# Patient Record
Sex: MALE | Race: BLACK OR AFRICAN AMERICAN | Employment: FULL TIME | URBAN - METROPOLITAN AREA
[De-identification: names, ages, dates, MRNs, and addresses within clinical notes are randomized per-mention and may not be internally consistent; named-entity substitution may affect disease eponyms.]

---

## 2017-02-15 ENCOUNTER — OFFICE VISIT (OUTPATIENT)
Dept: FAMILY MEDICINE CLINIC | Facility: CLINIC | Age: 37
End: 2017-02-15

## 2017-02-15 ENCOUNTER — TELEPHONE (OUTPATIENT)
Dept: FAMILY MEDICINE CLINIC | Facility: CLINIC | Age: 37
End: 2017-02-15

## 2017-02-15 ENCOUNTER — HOSPITAL ENCOUNTER (OUTPATIENT)
Dept: GENERAL RADIOLOGY | Facility: HOSPITAL | Age: 37
Discharge: HOME | End: 2017-02-15
Attending: FAMILY MEDICINE
Payer: COMMERCIAL

## 2017-02-15 VITALS
DIASTOLIC BLOOD PRESSURE: 92 MMHG | RESPIRATION RATE: 20 BRPM | SYSTOLIC BLOOD PRESSURE: 135 MMHG | TEMPERATURE: 98 F | HEART RATE: 80 BPM | WEIGHT: 282 LBS | BODY MASS INDEX: 39.48 KG/M2 | HEIGHT: 71 IN

## 2017-02-15 DIAGNOSIS — S89.92XA LEFT KNEE INJURY, INITIAL ENCOUNTER: ICD-10-CM

## 2017-02-15 DIAGNOSIS — S89.92XA LEFT KNEE INJURY, INITIAL ENCOUNTER: Primary | ICD-10-CM

## 2017-02-15 PROCEDURE — 73562 X-RAY EXAM OF KNEE 3: CPT

## 2017-02-15 PROCEDURE — 99212 OFFICE O/P EST SF 10 MIN: CPT | Performed by: FAMILY MEDICINE

## 2017-02-15 PROCEDURE — 99202 OFFICE O/P NEW SF 15 MIN: CPT | Performed by: FAMILY MEDICINE

## 2017-02-15 NOTE — PROGRESS NOTES
HPI:    Patient ID: Ana Sparks is a 29year old male. HPI Comments: Pt presents with an injury to his left knee when he slammed it on a ladder about 2 weeks ago. Pt did have swelling at time of injury.  Pt has had persistent pain over the knee cap area

## 2017-02-15 NOTE — TELEPHONE ENCOUNTER
Serena Knott from reg calling regarding order for R knee xray,  Kylah Norm pt is stating it is his L knee that needs xray. Order needs to be changed. Seymour Vernal nurse was notified.

## 2018-10-29 ENCOUNTER — OFFICE VISIT (OUTPATIENT)
Dept: FAMILY MEDICINE CLINIC | Facility: CLINIC | Age: 38
End: 2018-10-29
Payer: COMMERCIAL

## 2018-10-29 VITALS
BODY MASS INDEX: 41.58 KG/M2 | HEART RATE: 98 BPM | TEMPERATURE: 98 F | RESPIRATION RATE: 18 BRPM | DIASTOLIC BLOOD PRESSURE: 93 MMHG | SYSTOLIC BLOOD PRESSURE: 162 MMHG | HEIGHT: 71 IN | WEIGHT: 297 LBS

## 2018-10-29 DIAGNOSIS — N50.82 SCROTUM PAIN: ICD-10-CM

## 2018-10-29 DIAGNOSIS — Z98.52 S/P VASECTOMY: Primary | ICD-10-CM

## 2018-10-29 PROCEDURE — 99212 OFFICE O/P EST SF 10 MIN: CPT | Performed by: FAMILY MEDICINE

## 2018-10-29 PROCEDURE — 99213 OFFICE O/P EST LOW 20 MIN: CPT | Performed by: FAMILY MEDICINE

## 2018-10-29 NOTE — PROGRESS NOTES
HPI:    Patient ID: Farzana Herrera is a 41year old male. Pt presents today with a hx painful lump around the private area where cord was cut after having a vasectomy procedure 5 years ago. Pt did have an ultrasound at the time which was unremarkable.  Pt

## 2018-11-08 ENCOUNTER — HOSPITAL ENCOUNTER (OUTPATIENT)
Dept: ULTRASOUND IMAGING | Facility: HOSPITAL | Age: 38
Discharge: HOME | End: 2018-11-08
Attending: FAMILY MEDICINE
Payer: COMMERCIAL

## 2018-11-08 DIAGNOSIS — N50.82 SCROTUM PAIN: ICD-10-CM

## 2018-11-08 DIAGNOSIS — Z98.52 S/P VASECTOMY: ICD-10-CM

## 2018-11-08 PROCEDURE — 93975 VASCULAR STUDY: CPT | Performed by: FAMILY MEDICINE

## 2018-11-08 PROCEDURE — 76870 US EXAM SCROTUM: CPT | Performed by: FAMILY MEDICINE

## 2019-08-14 ENCOUNTER — HOSPITAL ENCOUNTER (OUTPATIENT)
Facility: HOSPITAL | Age: 39
Setting detail: OBSERVATION
Discharge: HOME | DRG: 310 | End: 2019-08-15
Attending: EMERGENCY MEDICINE | Admitting: HOSPITALIST
Payer: COMMERCIAL

## 2019-08-14 ENCOUNTER — NURSE TRIAGE (OUTPATIENT)
Dept: FAMILY MEDICINE CLINIC | Facility: CLINIC | Age: 39
End: 2019-08-14

## 2019-08-14 ENCOUNTER — APPOINTMENT (OUTPATIENT)
Dept: GENERAL RADIOLOGY | Facility: HOSPITAL | Age: 39
DRG: 310 | End: 2019-08-14
Attending: EMERGENCY MEDICINE
Payer: COMMERCIAL

## 2019-08-14 DIAGNOSIS — R00.2 PALPITATIONS: ICD-10-CM

## 2019-08-14 DIAGNOSIS — R77.8 TROPONIN I ABOVE REFERENCE RANGE: Primary | ICD-10-CM

## 2019-08-14 PROBLEM — R79.89 TROPONIN I ABOVE REFERENCE RANGE: Status: ACTIVE | Noted: 2019-08-14

## 2019-08-14 LAB
ANION GAP SERPL CALC-SCNC: 5 MMOL/L (ref 0–18)
BASOPHILS # BLD AUTO: 0.06 X10(3) UL (ref 0–0.2)
BASOPHILS NFR BLD AUTO: 0.9 %
BUN BLD-MCNC: 15 MG/DL (ref 7–18)
BUN/CREAT SERPL: 11.5 (ref 10–20)
CALCIUM BLD-MCNC: 9.3 MG/DL (ref 8.5–10.1)
CHLORIDE SERPL-SCNC: 105 MMOL/L (ref 98–112)
CHOLEST SMN-MCNC: 152 MG/DL (ref ?–200)
CO2 SERPL-SCNC: 29 MMOL/L (ref 21–32)
CREAT BLD-MCNC: 1.31 MG/DL (ref 0.7–1.3)
D DIMER PPP FEU-MCNC: <0.27 UG/ML FEU (ref ?–0.5)
DEPRECATED RDW RBC AUTO: 39.3 FL (ref 35.1–46.3)
EOSINOPHIL # BLD AUTO: 0.25 X10(3) UL (ref 0–0.7)
EOSINOPHIL NFR BLD AUTO: 3.7 %
ERYTHROCYTE [DISTWIDTH] IN BLOOD BY AUTOMATED COUNT: 13.5 % (ref 11–15)
GLUCOSE BLD-MCNC: 85 MG/DL (ref 70–99)
HCT VFR BLD AUTO: 47.9 % (ref 39–53)
HDLC SERPL-MCNC: 50 MG/DL (ref 40–59)
HGB BLD-MCNC: 15.5 G/DL (ref 13–17.5)
IMM GRANULOCYTES # BLD AUTO: 0.03 X10(3) UL (ref 0–1)
IMM GRANULOCYTES NFR BLD: 0.4 %
LDLC SERPL CALC-MCNC: 68 MG/DL (ref ?–100)
LYMPHOCYTES # BLD AUTO: 2.88 X10(3) UL (ref 1–4)
LYMPHOCYTES NFR BLD AUTO: 42.3 %
MCH RBC QN AUTO: 26.1 PG (ref 26–34)
MCHC RBC AUTO-ENTMCNC: 32.4 G/DL (ref 31–37)
MCV RBC AUTO: 80.6 FL (ref 80–100)
MONOCYTES # BLD AUTO: 0.75 X10(3) UL (ref 0.1–1)
MONOCYTES NFR BLD AUTO: 11 %
NEUTROPHILS # BLD AUTO: 2.84 X10 (3) UL (ref 1.5–7.7)
NEUTROPHILS # BLD AUTO: 2.84 X10(3) UL (ref 1.5–7.7)
NEUTROPHILS NFR BLD AUTO: 41.7 %
NONHDLC SERPL-MCNC: 102 MG/DL (ref ?–130)
NT-PROBNP SERPL-MCNC: 67 PG/ML (ref ?–125)
OSMOLALITY SERPL CALC.SUM OF ELEC: 288 MOSM/KG (ref 275–295)
PLATELET # BLD AUTO: 256 10(3)UL (ref 150–450)
POTASSIUM SERPL-SCNC: 3.8 MMOL/L (ref 3.5–5.1)
RBC # BLD AUTO: 5.94 X10(6)UL (ref 4.3–5.7)
SODIUM SERPL-SCNC: 139 MMOL/L (ref 136–145)
TRIGL SERPL-MCNC: 172 MG/DL (ref 30–149)
TROPONIN I SERPL-MCNC: 0.09 NG/ML (ref ?–0.04)
TROPONIN I SERPL-MCNC: 0.09 NG/ML (ref ?–0.04)
VLDLC SERPL CALC-MCNC: 34 MG/DL (ref 0–30)
WBC # BLD AUTO: 6.8 X10(3) UL (ref 4–11)

## 2019-08-14 PROCEDURE — 93268 ECG RECORD/REVIEW: CPT | Performed by: INTERNAL MEDICINE

## 2019-08-14 PROCEDURE — 71045 X-RAY EXAM CHEST 1 VIEW: CPT | Performed by: EMERGENCY MEDICINE

## 2019-08-14 PROCEDURE — 99222 1ST HOSP IP/OBS MODERATE 55: CPT | Performed by: HOSPITALIST

## 2019-08-14 RX ORDER — ONDANSETRON 2 MG/ML
4 INJECTION INTRAMUSCULAR; INTRAVENOUS EVERY 6 HOURS PRN
Status: DISCONTINUED | OUTPATIENT
Start: 2019-08-14 | End: 2019-08-15

## 2019-08-14 RX ORDER — ASPIRIN 81 MG/1
81 TABLET, CHEWABLE ORAL DAILY
Status: DISCONTINUED | OUTPATIENT
Start: 2019-08-15 | End: 2019-08-15

## 2019-08-14 RX ORDER — NITROGLYCERIN 0.4 MG/1
0.4 TABLET SUBLINGUAL EVERY 5 MIN PRN
Status: DISCONTINUED | OUTPATIENT
Start: 2019-08-14 | End: 2019-08-15

## 2019-08-14 RX ORDER — ASPIRIN 81 MG/1
324 TABLET, CHEWABLE ORAL ONCE
Status: COMPLETED | OUTPATIENT
Start: 2019-08-14 | End: 2019-08-14

## 2019-08-14 RX ORDER — ASPIRIN 81 MG/1
TABLET, CHEWABLE ORAL
Status: COMPLETED
Start: 2019-08-14 | End: 2019-08-14

## 2019-08-14 RX ORDER — METOPROLOL SUCCINATE 25 MG/1
25 TABLET, EXTENDED RELEASE ORAL
Status: DISCONTINUED | OUTPATIENT
Start: 2019-08-14 | End: 2019-08-15

## 2019-08-14 RX ORDER — HEPARIN SODIUM 5000 [USP'U]/ML
5000 INJECTION, SOLUTION INTRAVENOUS; SUBCUTANEOUS EVERY 12 HOURS SCHEDULED
Status: DISCONTINUED | OUTPATIENT
Start: 2019-08-14 | End: 2019-08-15

## 2019-08-14 RX ORDER — SODIUM CHLORIDE 0.9 % (FLUSH) 0.9 %
3 SYRINGE (ML) INJECTION AS NEEDED
Status: DISCONTINUED | OUTPATIENT
Start: 2019-08-14 | End: 2019-08-15

## 2019-08-14 NOTE — TELEPHONE ENCOUNTER
Action Requested: Summary for Provider     []  Critical Lab, Recommendations Needed  [] Need Additional Advice  [x]   FYI    []   Need Orders  [] Need Medications Sent to Pharmacy  []  Other     SUMMARY: Per protocol, advised ER now. Patient agreed.  Stat

## 2019-08-14 NOTE — TELEPHONE ENCOUNTER
Per review of pt's encounters, it appears he has just presented to the [address] [place] [address] ED. No notes available yet.

## 2019-08-14 NOTE — ED INITIAL ASSESSMENT (HPI)
Pt came in for sudden palpitations and dizziness at this gym this morning. Both resolved. Denied CP/SOB. RR even and nonlabored, speaking in full sentences, ambulatory with steady gait.

## 2019-08-14 NOTE — ED PROVIDER NOTES
Patient Seen in: [facility] AND [facility] Emergency Department    History   Patient presents with:  Arrythmia/Palpitations (cardiovascular)    Stated Complaint: elevated HR    HPI  52-year-old male with no significant medical history presenting for evaluation o peripheral edema   Pulmonary/Chest: Effort normal and breath sounds normal.   Abdominal: Soft. He exhibits no distension. There is no tenderness. Musculoskeletal: Normal range of motion. Neurological: He is alert.    No focal deficits   Skin: Skin is wa Differential includes ACS/MI,PE, electrode imbalance, anemia, autonomic instability, vs rate related change versus familial disorder. We will plan to evaluate labs, d-dimer as patient is low risk and reassess.       On reassessment, patient remains asympto

## 2019-08-15 ENCOUNTER — APPOINTMENT (OUTPATIENT)
Dept: CT IMAGING | Facility: HOSPITAL | Age: 39
DRG: 310 | End: 2019-08-15
Attending: INTERNAL MEDICINE
Payer: COMMERCIAL

## 2019-08-15 ENCOUNTER — APPOINTMENT (OUTPATIENT)
Dept: CV DIAGNOSTICS | Facility: HOSPITAL | Age: 39
DRG: 310 | End: 2019-08-15
Attending: HOSPITALIST
Payer: COMMERCIAL

## 2019-08-15 ENCOUNTER — ANCILLARY PROCEDURE (OUTPATIENT)
Dept: CARDIOLOGY | Age: 39
End: 2019-08-15
Attending: INTERNAL MEDICINE

## 2019-08-15 VITALS
RESPIRATION RATE: 18 BRPM | OXYGEN SATURATION: 97 % | BODY MASS INDEX: 36.77 KG/M2 | SYSTOLIC BLOOD PRESSURE: 121 MMHG | HEART RATE: 67 BPM | TEMPERATURE: 98 F | HEIGHT: 71 IN | WEIGHT: 262.69 LBS | DIASTOLIC BLOOD PRESSURE: 63 MMHG

## 2019-08-15 DIAGNOSIS — R00.2 PALPITATIONS: ICD-10-CM

## 2019-08-15 DIAGNOSIS — R00.2 PALPITATIONS: Primary | ICD-10-CM

## 2019-08-15 LAB
TROPONIN I SERPL-MCNC: 0.07 NG/ML (ref ?–0.04)
TSI SER-ACNC: 1.56 MIU/ML (ref 0.36–3.74)

## 2019-08-15 PROCEDURE — 99239 HOSP IP/OBS DSCHRG MGMT >30: CPT | Performed by: HOSPITALIST

## 2019-08-15 PROCEDURE — 75574 CT ANGIO HRT W/3D IMAGE: CPT | Performed by: INTERNAL MEDICINE

## 2019-08-15 PROCEDURE — 93306 TTE W/DOPPLER COMPLETE: CPT | Performed by: HOSPITALIST

## 2019-08-15 RX ORDER — METOPROLOL TARTRATE 50 MG/1
50 TABLET, FILM COATED ORAL ONCE AS NEEDED
Status: DISCONTINUED | OUTPATIENT
Start: 2019-08-16 | End: 2019-08-15

## 2019-08-15 RX ORDER — METOPROLOL TARTRATE 5 MG/5ML
INJECTION INTRAVENOUS
Status: DISCONTINUED
Start: 2019-08-15 | End: 2019-08-15

## 2019-08-15 RX ORDER — ALPRAZOLAM 0.25 MG/1
TABLET ORAL
Status: DISCONTINUED
Start: 2019-08-15 | End: 2019-08-15

## 2019-08-15 RX ORDER — DILTIAZEM HYDROCHLORIDE 5 MG/ML
5 INJECTION INTRAVENOUS SEE ADMIN INSTRUCTIONS
Status: DISCONTINUED | OUTPATIENT
Start: 2019-08-15 | End: 2019-08-15

## 2019-08-15 RX ORDER — NITROGLYCERIN 0.4 MG/1
0.4 TABLET SUBLINGUAL ONCE
Status: COMPLETED | OUTPATIENT
Start: 2019-08-15 | End: 2019-08-15

## 2019-08-15 RX ORDER — METOPROLOL TARTRATE 50 MG/1
50 TABLET, FILM COATED ORAL ONCE
Status: DISCONTINUED | OUTPATIENT
Start: 2019-08-16 | End: 2019-08-15

## 2019-08-15 RX ORDER — DILTIAZEM HYDROCHLORIDE 5 MG/ML
INJECTION INTRAVENOUS
Status: DISCONTINUED
Start: 2019-08-15 | End: 2019-08-15

## 2019-08-15 RX ORDER — METOPROLOL TARTRATE 5 MG/5ML
5 INJECTION INTRAVENOUS SEE ADMIN INSTRUCTIONS
Status: DISCONTINUED | OUTPATIENT
Start: 2019-08-15 | End: 2019-08-15

## 2019-08-15 RX ORDER — METOPROLOL TARTRATE 50 MG/1
50 TABLET, FILM COATED ORAL ONCE AS NEEDED
Status: COMPLETED | OUTPATIENT
Start: 2019-08-15 | End: 2019-08-15

## 2019-08-15 RX ORDER — METOPROLOL TARTRATE 100 MG/1
100 TABLET ORAL ONCE AS NEEDED
Status: COMPLETED | OUTPATIENT
Start: 2019-08-15 | End: 2019-08-15

## 2019-08-15 RX ORDER — METOPROLOL TARTRATE 100 MG/1
100 TABLET ORAL ONCE
Status: DISCONTINUED | OUTPATIENT
Start: 2019-08-15 | End: 2019-08-15

## 2019-08-15 RX ORDER — METOPROLOL TARTRATE 50 MG/1
50 TABLET, FILM COATED ORAL ONCE
Status: DISCONTINUED | OUTPATIENT
Start: 2019-08-15 | End: 2019-08-15

## 2019-08-15 RX ORDER — NITROGLYCERIN 0.4 MG/1
TABLET SUBLINGUAL
Status: DISCONTINUED
Start: 2019-08-15 | End: 2019-08-15

## 2019-08-15 RX ORDER — METOPROLOL TARTRATE 100 MG/1
100 TABLET ORAL ONCE
Status: DISCONTINUED | OUTPATIENT
Start: 2019-08-16 | End: 2019-08-15

## 2019-08-15 RX ORDER — METOPROLOL TARTRATE 100 MG/1
TABLET ORAL
Status: COMPLETED
Start: 2019-08-15 | End: 2019-08-15

## 2019-08-15 RX ORDER — ALPRAZOLAM 0.25 MG/1
0.25 TABLET ORAL ONCE
Status: COMPLETED | OUTPATIENT
Start: 2019-08-15 | End: 2019-08-15

## 2019-08-15 RX ORDER — POTASSIUM CHLORIDE 20 MEQ/1
40 TABLET, EXTENDED RELEASE ORAL ONCE
Status: COMPLETED | OUTPATIENT
Start: 2019-08-15 | End: 2019-08-15

## 2019-08-15 NOTE — PLAN OF CARE
Patient admitted last night. Patient and Wife oriented to room/unit. New Metoprolol initiated. Plan for 2D Echo and Cardiology consult today. Patient with no complaints at present time. Will continue to monitor.     Problem: Patient Centered Care  Goal: Florentino Gonzalez trends  - Monitor for bleeding, hypotension and signs of decreased cardiac output  - Evaluate effectiveness of vasoactive medications to optimize hemodynamic stability  - Monitor arterial and/or venous puncture sites for bleeding and/or hematoma  - Assess

## 2019-08-15 NOTE — DISCHARGE SUMMARY
[facility]  Discharge Summary     Ivone Lee  : 10/5/1980    Status: Inpatient  Day #: 1    Attending: Francine La MD  PCP: Alma Gonzalez MD     Date of Admission: 2019  Date of Discharge: 8/15/2019     Hospital Discharge Diagnos event monitor to evaluate for arrhythmia.      Consultants     Provider Role Specialty    René Mosley MD Consulting Physician  CARDIOLOGY    Brigido Verde MD Consulting Physician  HOSPITALIST           Physical Exam   Blood pressure 136/71,

## 2019-08-15 NOTE — PLAN OF CARE
Patient's discharge instructions were discussed and patient currently with 30 day monitor. Patient will follow up with Dr. Sagrario Sommer after 30 days. Will continue to monitor until discharge.         Problem: Patient Centered Care  Goal: Patient preferences are decreased cardiac output  - Evaluate effectiveness of vasoactive medications to optimize hemodynamic stability  - Monitor arterial and/or venous puncture sites for bleeding and/or hematoma  - Assess quality of pulses, skin color and temperature  - Assess f

## 2019-08-15 NOTE — H&P
[facility]    PATIENT'S NAME: Michelle Mcmahan   ATTENDING PHYSICIAN: Zac Marley MD   PATIENT ACCOUNT#:   [de-identified]    LOCATION:  [address] [place] [address] #:   [id number]       YOB: 1980  ADMISSION DATE:       08/ are available in our system or that I could find in [facility]. He had an initial troponin, however, of 0.089, subsequent was 0.094. His D-dimer was negative at less than 0.25. TSH was essentially normal at 1.560.   He was admitted to the cardiac te No rebound. EXTREMITIES:  No clubbing, cyanosis, calf tenderness, or palpable cords. There was no edema. SKIN:  Warm and dry without any significant rashes. NEUROLOGIC:  The patient was awake, alert, oriented x3 with no focal neurologic deficits.   [address] [place] [address]

## 2019-08-15 NOTE — PROGRESS NOTES
[facility]  Progress Note     Adolfo Moran  : 10/5/1980    Status: Inpatient  Day #: 1    Attending: Vera Mensah MD  PCP: Nestor Goodpasture, MD      Assessment and Plan     Tachycardia - HR was 183 on apple watch after exercise prior to admi cardiopulmonary abnormality.    Dictated by (CST): Blade Soto MD on 8/14/2019 at 21:05     Approved by (CST): Blade Soto MD on 8/14/2019 at 21:05          Ekg 12-lead    Result Date: 8/14/2019  ECG Report  Interpretation  --------------------------

## 2019-08-15 NOTE — IMAGING NOTE
TO RAD HOLDING AT 1645. HX TAKEN PT CONSENTED ON THE FLOOR. [facility] POC TESTING COMPLETED GFR = >60    CREATINE = 1.31    CTA ORDERED BY DR. JANG.      WAS PT GIVEN CTA  PREMEDICATED ON THE FLOOR.    1655: HR 66-72/MIN B

## 2019-08-15 NOTE — PAYOR COMM NOTE
--------------  ADMISSION REVIEW     Payor: [employer]  Subscriber #:  [id number]  Authorization Number: [id number]    Admit date: 8/14/19  Admit time: 2130         Patient Seen in: [facility] Emergency Department    History   Patient presents w PANEL (8) - Abnormal; Notable for the following components:       Result Value    Creatinine 1.31 (*)     All other components within normal limits   TROPONIN I - Abnormal; Notable for the following components:    Troponin 0.089 (*)     All other component pain.  No diaphoresis. He stopped working out, took a shower, rested for a bit but it was about 43 minutes before his heart rate decreased into the 160s and again stayed at that rate for about 45 minutes.   It was a total of about 2 hours before his heart equal, reactive to light. Sclerae anicteric. There was no sinus tenderness. Posterior pharynx was not injected. NECK:  Supple. LUNGS:  Clear with easy respiratory excursions. No wheezes, rhonchi, or rales. HEART:  Regular rate and rhythm.   Normal S1 ADMINISTERED IN LAST 1 DAY:  aspirin chewable tab 324 mg     Date Action Dose Route     8/14/2019 1920 Given 324 mg Oral       aspirin chewable tab 81 mg     Date Action Dose Route     8/15/2019 1033 Given 81 mg Oral       Heparin Sodium (Porcine) 5000 UNI

## 2019-08-15 NOTE — TELEPHONE ENCOUNTER
Patient went to [address] [place] [address] yesterday and was admitted. ASSESSMENT AND PLAN:    1. Episode of tachycardia:  Unsure whether this represented supraventricular tachycardia atrial fibrillation.   The patient is admitted to the cardiac telemetry unit on mon

## 2019-08-15 NOTE — PROGRESS NOTES
ADMISSION NOTE    41year old male with no significant PMH presents with 2 hours of tachycardia 160 to 180 and elevated troponins. Father has h/o tachyarrhytmia as well . Available medical records partially reviewed. Dictation to follow.     Lauren Self

## 2019-08-15 NOTE — PLAN OF CARE
Patient has been resting comfortably in bed, locked in the lowest position, call light within reach. Patient's awaiting BP and HR to decrease to have CTA; patient will discharge with vest if CTA comes back normal.  Will continue to monitor.         Problem for signs of decreased coronary artery perfusion - ex.  Angina  - Evaluate fluid balance, assess for edema, trend weights  Outcome: Progressing  Goal: Absence of cardiac arrhythmias or at baseline  Description  INTERVENTIONS:  - Continuous cardiac monitorin

## 2019-08-15 NOTE — CONSULTS
[facility]    Cardiology Consultation  [facility]    Pedro Morning Patient Status:  Inpatient    10/5/1980 MRN [id number]   Location [facility] 3W/SW Attending Harry Graham MD   Hosp Day # 1 [facility] Sanchez Medications:  nitroGLYCERIN (NITROSTAT) SL tab 0.4 mg 0.4 mg Sublingual Once   Metoprolol Tartrate (LOPRESSOR) tab 50 mg 50 mg Oral Once   Or      Metoprolol Tartrate (LOPRESSOR) tab 100 mg 100 mg Oral Once   [START ON 8/16/2019] Metoprolol Tartrate (LOPRE Daily   • Metoprolol Succinate ER  25 mg Oral Daily Beta Blocker       Continuous Infusions:       Physical Exam:    General: Alert and oriented x 3. No apparent distress. No respiratory  distress.   HEENT: Normocephalic, anicteric sclera, neck supple, no t absence of any arrhythmias. Recommendations:  See above    Thank you for allowing me to participate in the care of your patient.     Jessica Santiago  8/15/2019

## 2019-08-16 ENCOUNTER — TELEPHONE (OUTPATIENT)
Dept: CARDIOLOGY | Age: 39
End: 2019-08-16

## 2019-09-20 ENCOUNTER — TELEPHONE (OUTPATIENT)
Dept: CARDIOLOGY | Age: 39
End: 2019-09-20

## 2021-04-09 DIAGNOSIS — Z23 NEED FOR VACCINATION: ICD-10-CM

## 2022-06-02 ENCOUNTER — OFFICE VISIT (OUTPATIENT)
Dept: FAMILY MEDICINE CLINIC | Facility: CLINIC | Age: 42
End: 2022-06-02
Payer: COMMERCIAL

## 2022-06-02 VITALS
HEIGHT: 71 IN | HEART RATE: 93 BPM | BODY MASS INDEX: 42.56 KG/M2 | TEMPERATURE: 98 F | SYSTOLIC BLOOD PRESSURE: 150 MMHG | DIASTOLIC BLOOD PRESSURE: 91 MMHG | RESPIRATION RATE: 18 BRPM | WEIGHT: 304 LBS

## 2022-06-02 DIAGNOSIS — L60.0 INGROWN TOENAIL: ICD-10-CM

## 2022-06-02 DIAGNOSIS — M67.431 GANGLION OF RIGHT WRIST: ICD-10-CM

## 2022-06-02 PROCEDURE — 3080F DIAST BP >= 90 MM HG: CPT | Performed by: FAMILY MEDICINE

## 2022-06-02 PROCEDURE — 3008F BODY MASS INDEX DOCD: CPT | Performed by: FAMILY MEDICINE

## 2022-06-02 PROCEDURE — 3077F SYST BP >= 140 MM HG: CPT | Performed by: FAMILY MEDICINE

## 2022-06-02 PROCEDURE — 99202 OFFICE O/P NEW SF 15 MIN: CPT | Performed by: FAMILY MEDICINE

## 2022-06-02 NOTE — PROGRESS NOTES
Subjective:   Patient ID: Joselo Pratt is a 37year old male. Pt presents with a lump of the back of the right wrist that has been present for about one month. Pt also has hx of recurring ingrown toenail. No sig symptoms now but would like information for podiatrist if it recurs. History/Other:   Review of Systems  No current outpatient medications on file. Allergies:No Known Allergies    Objective:   Physical Exam  Constitutional:       Appearance: Normal appearance. Musculoskeletal:      Comments: Right hand/ wrist; lump c/w ganglion cyst of the dorsum of right hand/ wrist area. Non-tender. No sig pains. Neurological:      Mental Status: He is alert. Assessment & Plan:   Ganglion of right wrist:  - After discussion with patient, to monitor for symptoms and call if any significant symptoms; consider follow up with Dr Sage Jimenez if sig symptoms or not resolved. Ingrown toenail: hx of ingrown nail: no sig symptoms:  - Information provided for podiatry and referral generated. Follow up as needed. No orders of the defined types were placed in this encounter.       Meds This Visit:  Requested Prescriptions      No prescriptions requested or ordered in this encounter       Imaging & Referrals:  PLASTIC SURGERY - INTERNAL  PODIATRY - INTERNAL

## 2023-10-26 ENCOUNTER — OFFICE VISIT (OUTPATIENT)
Dept: FAMILY MEDICINE CLINIC | Facility: CLINIC | Age: 43
End: 2023-10-26

## 2023-10-26 VITALS
SYSTOLIC BLOOD PRESSURE: 132 MMHG | BODY MASS INDEX: 42 KG/M2 | WEIGHT: 300 LBS | HEART RATE: 101 BPM | TEMPERATURE: 98 F | DIASTOLIC BLOOD PRESSURE: 84 MMHG | HEIGHT: 71 IN | RESPIRATION RATE: 20 BRPM

## 2023-10-26 DIAGNOSIS — Z00.00 ROUTINE PHYSICAL EXAMINATION: Primary | ICD-10-CM

## 2023-10-26 DIAGNOSIS — E66.3 OVERWEIGHT FOR HEIGHT: ICD-10-CM

## 2023-10-26 PROCEDURE — 3075F SYST BP GE 130 - 139MM HG: CPT | Performed by: FAMILY MEDICINE

## 2023-10-26 PROCEDURE — 3079F DIAST BP 80-89 MM HG: CPT | Performed by: FAMILY MEDICINE

## 2023-10-26 PROCEDURE — 99396 PREV VISIT EST AGE 40-64: CPT | Performed by: FAMILY MEDICINE

## 2023-10-26 PROCEDURE — 3008F BODY MASS INDEX DOCD: CPT | Performed by: FAMILY MEDICINE

## 2023-10-26 NOTE — PROGRESS NOTES
Subjective:   Patient ID: Zoya Ramon is a 44year old male. Patient is here for routine physical exam. No acute issues. No significant chronic medical problems. Patient is requesting testing. Diet and exercise have been good. Has been working out and trying to eat better. Some heartburn when he eats late at night. Past medical history, family history, and social history were reviewed. History/Other:   Review of Systems   Constitutional: Negative. Negative for fever. HENT: Negative. Eyes: Negative. Respiratory: Negative. Negative for cough and shortness of breath. Cardiovascular: Negative. Negative for chest pain. Gastrointestinal: Negative. Negative for abdominal distention and abdominal pain. Some acid reflux     Genitourinary: Negative. Negative for difficulty urinating and dysuria. Musculoskeletal: Negative. Negative for back pain. Skin: Negative. Neurological: Negative. Negative for dizziness and headaches. Psychiatric/Behavioral: Negative. Negative for dysphoric mood. The patient is not nervous/anxious. No current outpatient medications on file. Allergies:No Known Allergies    Objective:   Physical Exam  Constitutional:       Appearance: Normal appearance. He is well-developed. HENT:      Head: Normocephalic. Right Ear: Tympanic membrane, ear canal and external ear normal.      Left Ear: Tympanic membrane, ear canal and external ear normal.      Nose: Nose normal.      Mouth/Throat:      Mouth: Mucous membranes are moist.      Pharynx: No oropharyngeal exudate or posterior oropharyngeal erythema. Eyes:      Conjunctiva/sclera: Conjunctivae normal.   Cardiovascular:      Rate and Rhythm: Normal rate and regular rhythm. Pulses: Normal pulses. Heart sounds: Normal heart sounds. Pulmonary:      Effort: Pulmonary effort is normal. No respiratory distress. Breath sounds: Normal breath sounds. No wheezing or rales. Abdominal:      General: Abdomen is flat. There is no distension. Palpations: Abdomen is soft. There is no mass. Tenderness: There is no abdominal tenderness. Musculoskeletal:         General: Normal range of motion. Cervical back: Normal range of motion and neck supple. Skin:     General: Skin is warm. Neurological:      General: No focal deficit present. Mental Status: He is alert and oriented to person, place, and time. Sensory: No sensory deficit. Deep Tendon Reflexes: Reflexes are normal and symmetric. Reflexes normal.   Psychiatric:         Mood and Affect: Mood normal.         Behavior: Behavior normal.         Assessment & Plan:   Routine physical examination:  - Exam is unremarkable. Screening tests were discussed, and after discussion, will check lab work as below. Healthy diet, exercise, and weight were discussed. To call if problems and follow up and further management after testing. Routine follow up. Overweight for height:  - After discussion, will send to Dr Paolo Whitlock for further evaluation and treatment; To call if any significant symptoms. Orders Placed This Encounter      Comp Metabolic Panel (14)      CBC, Platelet;  No Differential      Lipid Panel      Meds This Visit:  Requested Prescriptions      No prescriptions requested or ordered in this encounter       Imaging & Referrals:  BARIATRICS - INTERNAL

## 2023-11-17 ENCOUNTER — LAB ENCOUNTER (OUTPATIENT)
Dept: LAB | Age: 43
End: 2023-11-17
Attending: FAMILY MEDICINE
Payer: COMMERCIAL

## 2023-11-17 DIAGNOSIS — Z00.00 ROUTINE PHYSICAL EXAMINATION: ICD-10-CM

## 2023-11-17 DIAGNOSIS — R73.09 ELEVATED GLUCOSE: ICD-10-CM

## 2023-11-17 LAB
ALBUMIN SERPL-MCNC: 4.5 G/DL (ref 3.2–4.8)
ALBUMIN/GLOB SERPL: 1.5 {RATIO} (ref 1–2)
ALP LIVER SERPL-CCNC: 46 U/L
ALT SERPL-CCNC: 79 U/L
ANION GAP SERPL CALC-SCNC: 5 MMOL/L (ref 0–18)
AST SERPL-CCNC: 43 U/L (ref ?–34)
BILIRUB SERPL-MCNC: 0.6 MG/DL (ref 0.3–1.2)
BUN BLD-MCNC: 10 MG/DL (ref 9–23)
BUN/CREAT SERPL: 8.1 (ref 10–20)
CALCIUM BLD-MCNC: 9.9 MG/DL (ref 8.7–10.4)
CHLORIDE SERPL-SCNC: 107 MMOL/L (ref 98–112)
CHOLEST SERPL-MCNC: 178 MG/DL (ref ?–200)
CO2 SERPL-SCNC: 28 MMOL/L (ref 21–32)
CREAT BLD-MCNC: 1.24 MG/DL
DEPRECATED RDW RBC AUTO: 40.3 FL (ref 35.1–46.3)
EGFRCR SERPLBLD CKD-EPI 2021: 74 ML/MIN/1.73M2 (ref 60–?)
ERYTHROCYTE [DISTWIDTH] IN BLOOD BY AUTOMATED COUNT: 13.5 % (ref 11–15)
FASTING PATIENT LIPID ANSWER: YES
FASTING STATUS PATIENT QL REPORTED: YES
GLOBULIN PLAS-MCNC: 3 G/DL (ref 2.8–4.4)
GLUCOSE BLD-MCNC: 131 MG/DL (ref 70–99)
HCT VFR BLD AUTO: 48.3 %
HDLC SERPL-MCNC: 39 MG/DL (ref 40–59)
HGB BLD-MCNC: 15.2 G/DL
LDLC SERPL CALC-MCNC: 118 MG/DL (ref ?–100)
MCH RBC QN AUTO: 25.8 PG (ref 26–34)
MCHC RBC AUTO-ENTMCNC: 31.5 G/DL (ref 31–37)
MCV RBC AUTO: 82 FL
NONHDLC SERPL-MCNC: 139 MG/DL (ref ?–130)
OSMOLALITY SERPL CALC.SUM OF ELEC: 291 MOSM/KG (ref 275–295)
PLATELET # BLD AUTO: 263 10(3)UL (ref 150–450)
POTASSIUM SERPL-SCNC: 4.4 MMOL/L (ref 3.5–5.1)
PROT SERPL-MCNC: 7.5 G/DL (ref 5.7–8.2)
RBC # BLD AUTO: 5.89 X10(6)UL
SODIUM SERPL-SCNC: 140 MMOL/L (ref 136–145)
TRIGL SERPL-MCNC: 113 MG/DL (ref 30–149)
VLDLC SERPL CALC-MCNC: 20 MG/DL (ref 0–30)
WBC # BLD AUTO: 5.6 X10(3) UL (ref 4–11)

## 2023-11-17 PROCEDURE — 83036 HEMOGLOBIN GLYCOSYLATED A1C: CPT

## 2023-11-17 PROCEDURE — 80061 LIPID PANEL: CPT

## 2023-11-17 PROCEDURE — 85027 COMPLETE CBC AUTOMATED: CPT

## 2023-11-17 PROCEDURE — 80053 COMPREHEN METABOLIC PANEL: CPT

## 2023-11-17 PROCEDURE — 36415 COLL VENOUS BLD VENIPUNCTURE: CPT

## 2023-11-18 LAB
EST. AVERAGE GLUCOSE BLD GHB EST-MCNC: 166 MG/DL (ref 68–126)
HBA1C MFR BLD: 7.4 % (ref ?–5.7)

## 2025-03-13 ENCOUNTER — OFFICE VISIT (OUTPATIENT)
Dept: FAMILY MEDICINE CLINIC | Facility: CLINIC | Age: 45
End: 2025-03-13
Payer: COMMERCIAL

## 2025-03-13 VITALS
DIASTOLIC BLOOD PRESSURE: 76 MMHG | SYSTOLIC BLOOD PRESSURE: 124 MMHG | RESPIRATION RATE: 22 BRPM | WEIGHT: 286 LBS | HEIGHT: 71 IN | BODY MASS INDEX: 40.04 KG/M2 | TEMPERATURE: 98 F | HEART RATE: 108 BPM

## 2025-03-13 DIAGNOSIS — L91.8 SKIN TAG: Primary | ICD-10-CM

## 2025-03-13 PROCEDURE — 99213 OFFICE O/P EST LOW 20 MIN: CPT | Performed by: FAMILY MEDICINE

## 2025-03-13 NOTE — PROGRESS NOTES
Subjective:   Patient ID: Yuni Card is a 44 year old male.    Pt presents with a skin tag of his right hip for many years. Pt states now irritated. No fevers or sig pains.        History/Other:   Review of Systems  No current outpatient medications on file.     Allergies:Allergies[1]    Objective:   Physical Exam  Constitutional:       Appearance: Normal appearance.   Skin:     Comments: Skin growth of the anterior hip area. No drainage or sig erythema.   Neurological:      Mental Status: He is alert.         Assessment & Plan:   1. Skin tag: right hip area:  - After discussion, will send to dermatology for further evaluation and treatment; To call if any significant symptoms.          No orders of the defined types were placed in this encounter.      Meds This Visit:  Requested Prescriptions      No prescriptions requested or ordered in this encounter       Imaging & Referrals:  DERM - INTERNAL         [1] No Known Allergies

## (undated) NOTE — MR AVS SNAPSHOT
[facility] [place] [address] [address] [address] [phone]               Thank you for choosing us for your health care visit with Tammy Castleman, MD.  We are glad to serve you and happy to provide you with this summary of y Lifestyle Modification Recommendations:    Modification Recommendation   Weight Reduction Maintain normal body weight (body mass index 18.5-24.9 kg/m2)   DASH eating plan Adopt a diet rich in fruits, vegetables, and low fat dairy products with reduced cont motivated   Don’t forget strength training with weights and resistance Set goals and track your progress   You don’t need to join a gym. Home exercises work great.  Put more priority on exercise in your life                    Visit [facility]